# Patient Record
Sex: FEMALE | Race: WHITE
[De-identification: names, ages, dates, MRNs, and addresses within clinical notes are randomized per-mention and may not be internally consistent; named-entity substitution may affect disease eponyms.]

---

## 2019-01-04 ENCOUNTER — HOSPITAL ENCOUNTER (EMERGENCY)
Dept: HOSPITAL 91 - E/R | Age: 54
Discharge: HOME | End: 2019-01-04
Payer: COMMERCIAL

## 2019-01-04 ENCOUNTER — HOSPITAL ENCOUNTER (EMERGENCY)
Dept: HOSPITAL 10 - E/R | Age: 54
Discharge: HOME | End: 2019-01-04
Payer: COMMERCIAL

## 2019-01-04 VITALS
HEIGHT: 65.75 IN | BODY MASS INDEX: 23.56 KG/M2 | HEIGHT: 65.75 IN | BODY MASS INDEX: 23.56 KG/M2 | WEIGHT: 144.84 LBS | WEIGHT: 144.84 LBS

## 2019-01-04 VITALS — HEART RATE: 101 BPM | SYSTOLIC BLOOD PRESSURE: 198 MMHG | RESPIRATION RATE: 20 BRPM | DIASTOLIC BLOOD PRESSURE: 120 MMHG

## 2019-01-04 DIAGNOSIS — F17.210: ICD-10-CM

## 2019-01-04 DIAGNOSIS — I10: ICD-10-CM

## 2019-01-04 DIAGNOSIS — R32: Primary | ICD-10-CM

## 2019-01-04 LAB
ADD MAN DIFF?: NO
ADD UMIC: NO
ALANINE AMINOTRANSFERASE: 22 IU/L (ref 13–69)
ALBUMIN/GLOBULIN RATIO: 1.4
ALBUMIN: 4.5 G/DL (ref 3.3–4.9)
ALKALINE PHOSPHATASE: 101 IU/L (ref 42–121)
ANION GAP: 7 (ref 5–13)
ASPARTATE AMINO TRANSFERASE: 30 IU/L (ref 15–46)
BASOPHIL #: 0.1 10^3/UL (ref 0–0.1)
BASOPHILS %: 0.6 % (ref 0–2)
BILIRUBIN,DIRECT: 0 MG/DL (ref 0–0.2)
BILIRUBIN,TOTAL: 0.3 MG/DL (ref 0.2–1.3)
BLOOD UREA NITROGEN: 20 MG/DL (ref 7–20)
CALCIUM: 10.1 MG/DL (ref 8.4–10.2)
CARBON DIOXIDE: 30 MMOL/L (ref 21–31)
CHLORIDE: 102 MMOL/L (ref 97–110)
CREATININE: 0.9 MG/DL (ref 0.44–1)
EOSINOPHILS #: 0.2 10^3/UL (ref 0–0.5)
EOSINOPHILS %: 2.5 % (ref 0–7)
GLOBULIN: 3.2 G/DL (ref 1.3–3.2)
GLUCOSE: 106 MG/DL (ref 70–220)
HEMATOCRIT: 44.6 % (ref 37–47)
HEMOGLOBIN: 15.1 G/DL (ref 12–16)
IMMATURE GRANS #M: 0.02 10^3/UL (ref 0–0.03)
IMMATURE GRANS % (M): 0.3 % (ref 0–0.43)
LIPASE: 55 U/L (ref 23–300)
LYMPHOCYTES #: 1.8 10^3/UL (ref 0.8–2.9)
LYMPHOCYTES %: 23.2 % (ref 15–51)
MEAN CORPUSCULAR HEMOGLOBIN: 31.4 PG (ref 29–33)
MEAN CORPUSCULAR HGB CONC: 33.9 G/DL (ref 32–37)
MEAN CORPUSCULAR VOLUME: 92.7 FL (ref 82–101)
MEAN PLATELET VOLUME: 9 FL (ref 7.4–10.4)
MONOCYTE #: 0.6 10^3/UL (ref 0.3–0.9)
MONOCYTES %: 7.7 % (ref 0–11)
NEUTROPHIL #: 5.2 10^3/UL (ref 1.6–7.5)
NEUTROPHILS %: 65.7 % (ref 39–77)
NUCLEATED RED BLOOD CELLS #: 0 10^3/UL (ref 0–0)
NUCLEATED RED BLOOD CELLS%: 0 /100WBC (ref 0–0)
PLATELET COUNT: 372 10^3/UL (ref 140–415)
POTASSIUM: 4 MMOL/L (ref 3.5–5.1)
RED BLOOD COUNT: 4.81 10^6/UL (ref 4.2–5.4)
RED CELL DISTRIBUTION WIDTH: 12.7 % (ref 11.5–14.5)
SODIUM: 139 MMOL/L (ref 135–144)
TOTAL PROTEIN: 7.7 G/DL (ref 6.1–8.1)
UR ASCORBIC ACID: NEGATIVE MG/DL
UR BILIRUBIN (DIP): NEGATIVE MG/DL
UR BLOOD (DIP): NEGATIVE MG/DL
UR CLARITY: CLEAR
UR COLOR: YELLOW
UR GLUCOSE (DIP): NEGATIVE MG/DL
UR KETONES (DIP): NEGATIVE MG/DL
UR LEUKOCYTE ESTERASE (DIP): NEGATIVE LEU/UL
UR NITRITE (DIP): NEGATIVE MG/DL
UR PH (DIP): 6 (ref 5–9)
UR SPECIFIC GRAVITY (DIP): 1.01 (ref 1–1.03)
UR TOTAL PROTEIN (DIP): NEGATIVE MG/DL
UR UROBILINOGEN (DIP): NEGATIVE MG/DL
WHITE BLOOD COUNT: 7.9 10^3/UL (ref 4.8–10.8)

## 2019-01-04 PROCEDURE — 81003 URINALYSIS AUTO W/O SCOPE: CPT

## 2019-01-04 PROCEDURE — 36415 COLL VENOUS BLD VENIPUNCTURE: CPT

## 2019-01-04 PROCEDURE — 83690 ASSAY OF LIPASE: CPT

## 2019-01-04 PROCEDURE — 85025 COMPLETE CBC W/AUTO DIFF WBC: CPT

## 2019-01-04 PROCEDURE — 80053 COMPREHEN METABOLIC PANEL: CPT

## 2019-01-04 PROCEDURE — 99284 EMERGENCY DEPT VISIT MOD MDM: CPT

## 2019-01-04 RX ADMIN — THIAMINE HYDROCHLORIDE 1 MLS/HR: 100 INJECTION, SOLUTION INTRAMUSCULAR; INTRAVENOUS at 22:14

## 2019-01-04 NOTE — ERD
ER Documentation


Chief Complaint


Chief Complaint





worsening incontinence x months,not on any meds,HTN@triage





HPI


This is a 53-year-old female with a history of hypertension who presents to the 


emergency room for evaluation of urinary incontinence.  The patient states that 


she has had urinary incontinence for the past 3 months.  The patient denies any 


fevers, lower back pain, numbness in the lower extremities or any generalized 


weakness.  She states she came to the ER today for evaluation of her 


incontinence as it is gotten worse.  The patient denies any aggravating or 


relieving factors for her symptoms and denies seeing a urologist in the past.





ROS


All systems reviewed and are negative except as per history of present illness.





Allergies


Allergies:  


Coded Allergies:  


     No Known Allergy (Unverified , 1/4/19)





PMhx/Soc


Medical and Surgical Hx:  pt denies Surgical Hx


History of Surgery:  No


Anesthesia Reaction:  No


Hx Neurological Disorder:  No


Hx Respiratory Disorders:  No


Hx Cardiac Disorders:  Yes (HTN )


Hx Psychiatric Problems:  No


Hx Miscellaneous Medical Probl:  Yes (INSOMNIA )


Hx Alcohol Use:  No


Hx Substance Use:  Yes (MARIJUANA TODAY )


Hx Tobacco Use:  Yes


Smoking Status:  Current every day smoker





Physical Exam


Vitals





Vital Signs


  Date      Temp  Pulse  Resp  B/P (MAP)   Pulse Ox  O2          O2 Flow    FiO2


Time                                                 Delivery    Rate


    1/4/19  98.1    101    20     198/120        98  Room Air


     22:24                          (146)


    1/4/19  99.4    121    20     204/103        94


     18:50                          (136)





Physical Exam


INITIAL VITAL SIGNS: Reviewed by me


GENERAL:  The patient is well developed and appropriate for usual state of 


health in no apparent distress


HEENT: Pupils equal, round, and reactive to light.  EOMI. There is no scleral 


icterus.


NECK:  C-spine is soft and supple, there is no meningismus.  There is no c


ervical lymphadenopathy.


LUNGS:  Clear to auscultation bilaterally. There are no rales, wheezes or r


honchi.


HEART:  Regular rate and rhythm, no murmurs, clicks, rubs or gallops.


ABDOMEN:  Soft, non-tender, non-distended.  There are bowel sounds in all four 


quadrants. No rebound or guarding.


EXTREMITIES:  There is no peripheral cyanosis or edema.  No focal swelling or 


erythema.


NEUROLOGICAL:  The patient moves all four extremities with 5/5 strength.  


Cranial nerves II - XII are intact. Normal gait. Alert and oriented ,no saddle 


anesthesia


SKIN:  There is no apparent rash or petechiae.


HEME/LYMPHATIC:  There is no evidence of excessive bruising or lymphedema.


PSYCHIATRIC:  The patient does not appear anxious or depressed.


Result Diagram:  


1/4/19 2210 1/4/19 2210





Results 24 hrs





Laboratory Tests


              Test
                                   1/4/19
22:10


              White Blood Count                       7.9 10^3/ul


              Red Blood Count                        4.81 10^6/ul


              Hemoglobin                                15.1 g/dl


              Hematocrit                                   44.6 %


              Mean Corpuscular Volume                     92.7 fl


              Mean Corpuscular Hemoglobin                 31.4 pg


              Mean Corpuscular Hemoglobin
Concent      33.9 g/dl 



              Red Cell Distribution Width                  12.7 %


              Platelet Count                          372 10^3/UL


              Mean Platelet Volume                         9.0 fl


              Immature Granulocytes %                     0.300 %


              Neutrophils %                                65.7 %


              Lymphocytes %                                23.2 %


              Monocytes %                                   7.7 %


              Eosinophils %                                 2.5 %


              Basophils %                                   0.6 %


              Nucleated Red Blood Cells %             0.0 /100WBC


              Immature Granulocytes #               0.020 10^3/ul


              Neutrophils #                           5.2 10^3/ul


              Lymphocytes #                           1.8 10^3/ul


              Monocytes #                             0.6 10^3/ul


              Eosinophils #                           0.2 10^3/ul


              Basophils #                             0.1 10^3/ul


              Nucleated Red Blood Cells #             0.0 10^3/ul


              Urine Color                          YELLOW


              Urine Clarity                        CLEAR


              Urine pH                                        6.0


              Urine Specific Gravity                        1.014


              Urine Ketones                        NEGATIVE mg/dL


              Urine Nitrite                        NEGATIVE mg/dL


              Urine Bilirubin                      NEGATIVE mg/dL


              Urine Urobilinogen                   NEGATIVE mg/dL


              Urine Leukocyte Esterase
            NEGATIVE
Berta/ul


              Urine Hemoglobin                     NEGATIVE mg/dL


              Urine Glucose                        NEGATIVE mg/dL


              Urine Total Protein                  NEGATIVE mg/dl


              Sodium Level                             139 mmol/L


              Potassium Level                          4.0 mmol/L


              Chloride Level                           102 mmol/L


              Carbon Dioxide Level                      30 mmol/L


              Anion Gap                                         7


              Blood Urea Nitrogen                        20 mg/dl


              Creatinine                               0.90 mg/dl


              Est Glomerular Filtrat Rate
mL/min   > 60 mL/min 



              Glucose Level                             106 mg/dl


              Calcium Level                            10.1 mg/dl


              Total Bilirubin                           0.3 mg/dl


              Direct Bilirubin                         0.00 mg/dl


              Indirect Bilirubin                        0.3 mg/dl


              Aspartate Amino Transf
(AST/SGOT)          30 IU/L 



              Alanine Aminotransferase
(ALT/SGPT)        22 IU/L 



              Alkaline Phosphatase                       101 IU/L


              Total Protein                              7.7 g/dl


              Albumin                                    4.5 g/dl


              Globulin                                  3.20 g/dl


              Albumin/Globulin Ratio                         1.40


              Lipase                                       55 U/L





Current Medications


 Medications
   Dose
          Sig/Reji
       Start Time
   Status  Last


 (Trade)       Ordered        Route
 PRN     Stop Time              Admin
Dose


                              Reason                                Admin


 Sodium         1,000 ml @ 
   Q1H STAT
      1/4/19        DC            1/4/19


Chloride       1,000 mls/hr   IV
            21:48
 1/4/19                22:14



                                             22:47








Procedures/MDM


This 53-year-old female presents to the emergency room for evaluation of urinary


incontinence for the past 3 months.  On my exam the patient had no neuro 


deficits.  She was afebrile and had no saddle anesthesia.  Lab work is normal, 


urinalysis reveals no infection.  I doubt cauda equina given the chronicity of 


this patient's symptoms and her lack of neuro deficits.  The patient is likely 


suffering from urinary incontinence and will be discharged home with a p


rescription for oxybutynin and will be given a referral for outpatient urology. 


She was given strict return precautions and does feel comfortable with her plan 


of care.  The patient is hypertensive however she has a history of chronic 


hypertension and denies any symptoms of chest pain, shortness of breath, blurred


vision, headache or nausea at this time





Departure


Diagnosis:  


   Primary Impression:  


   Urinary incontinence


   Additional Impression:  


   Hypertension


Condition:  Stable











ROSALIA PRETTY DO               Jan 4, 2019 22:55